# Patient Record
Sex: FEMALE | Race: WHITE | URBAN - METROPOLITAN AREA
[De-identification: names, ages, dates, MRNs, and addresses within clinical notes are randomized per-mention and may not be internally consistent; named-entity substitution may affect disease eponyms.]

---

## 2021-11-02 ENCOUNTER — OFFICE VISIT (OUTPATIENT)
Dept: PULMONOLOGY | Age: 46
End: 2021-11-02
Payer: COMMERCIAL

## 2021-11-02 VITALS
OXYGEN SATURATION: 96 % | HEART RATE: 103 BPM | SYSTOLIC BLOOD PRESSURE: 108 MMHG | WEIGHT: 234 LBS | BODY MASS INDEX: 36.73 KG/M2 | HEIGHT: 67 IN | DIASTOLIC BLOOD PRESSURE: 70 MMHG

## 2021-11-02 DIAGNOSIS — R06.09 DOE (DYSPNEA ON EXERTION): ICD-10-CM

## 2021-11-02 DIAGNOSIS — J45.40 MODERATE PERSISTENT ASTHMA WITHOUT COMPLICATION: Primary | ICD-10-CM

## 2021-11-02 PROCEDURE — G8417 CALC BMI ABV UP PARAM F/U: HCPCS | Performed by: INTERNAL MEDICINE

## 2021-11-02 PROCEDURE — 99205 OFFICE O/P NEW HI 60 MIN: CPT | Performed by: INTERNAL MEDICINE

## 2021-11-02 PROCEDURE — G8427 DOCREV CUR MEDS BY ELIG CLIN: HCPCS | Performed by: INTERNAL MEDICINE

## 2021-11-02 PROCEDURE — G8484 FLU IMMUNIZE NO ADMIN: HCPCS | Performed by: INTERNAL MEDICINE

## 2021-11-02 PROCEDURE — 1036F TOBACCO NON-USER: CPT | Performed by: INTERNAL MEDICINE

## 2021-11-02 RX ORDER — ATORVASTATIN CALCIUM 20 MG/1
20 TABLET, FILM COATED ORAL NIGHTLY
COMMUNITY
Start: 2021-07-06

## 2021-11-02 RX ORDER — BIOTIN 10 MG
1 TABLET ORAL DAILY
COMMUNITY

## 2021-11-02 RX ORDER — ESCITALOPRAM OXALATE 10 MG/1
10 TABLET ORAL
COMMUNITY

## 2021-11-02 RX ORDER — LANOLIN ALCOHOL/MO/W.PET/CERES
3 CREAM (GRAM) TOPICAL NIGHTLY
COMMUNITY

## 2021-11-02 RX ORDER — IPRATROPIUM BROMIDE AND ALBUTEROL SULFATE 2.5; .5 MG/3ML; MG/3ML
1 SOLUTION RESPIRATORY (INHALATION) 4 TIMES DAILY
COMMUNITY
Start: 2021-10-20

## 2021-11-02 RX ORDER — FLUTICASONE PROPIONATE 50 MCG
1-2 SPRAY, SUSPENSION (ML) NASAL 2 TIMES DAILY
COMMUNITY
Start: 2021-08-20

## 2021-11-02 RX ORDER — OMEPRAZOLE 20 MG/1
20 CAPSULE, DELAYED RELEASE ORAL DAILY
COMMUNITY

## 2021-11-02 RX ORDER — MAGNESIUM GLUCONATE 27 MG(500)
500 TABLET ORAL DAILY
COMMUNITY

## 2021-11-02 NOTE — PROGRESS NOTES
oncologist, Dr. Plummer Comes today. [facility] As per Dr. Marcelo Castrejon, patient had a CT in September 2021 that showed findings similar to the CT from October 2021. Based on the CT, she changed patient's chemotherapy to p.o. chemotherapy. Patient has been initiated on second cycle of p.o. chemotherapy. The CT from September 2021 is not available for me to review today. I was able to review the CT chest from May 2021 performed at [facility] that showed right hilar fullness with bilateral pulmonary nodules. She had a repeat CT on 10/19/2021 at [facility] that showed worsening right hilar and infrahilar mass with bilateral pulmonary nodules. Also noted to have intralobular septal thickening and 2 low-attenuation areas in the left lower lobe. Multiple sclerotic lesions involving thoracic spine was also noted. I personally reviewed and interpreted all images. REVIEW OF SYSTEMS:   CONSTITUTIONAL SYMPTOMS: The patient denies fever, fatigue, night sweats, weight loss or weight gain. HEENT: No vision changes. No tinnitus, Denies sinus pain. No hoarseness, or dysphagia. NECK: Patient denies swelling in the neck. CARDIOVASCULAR: Denies chest pain, palpitation, syncope. RESPIRATORY: see above. GASTROINTESTINAL: Denies nausea, abdominal pain or change in bowel function. GENITOURINARY: Denies obstructive symptoms. No history of incontinence. BREASTS: No masses or lumps in the breasts. SKIN: No rashes or itching. MUSCULOSKELETAL: Denies weakness or bone pain. NEUROLOGICAL: No headaches or seizures. PSYCHIATRIC: Denies mood swings or depression. ENDOCRINE: Denies heat or cold intolerance or excessive thirst.  HEMATOLOGIC/LYMPHATIC: Denies easy bruising or lymph node swelling. ALLERGIC/IMMUNOLOGIC: No environmental allergies.     PAST MEDICAL HISTORY:   Past Medical History:   Diagnosis Date    ADD (attention deficit disorder)     Asthma     Breast cancer ([facility] [address]  Hypothyroid     Migraines     VASCULAR ARTERY       PAST SURGICAL HISTORY:   Past Surgical History:   Procedure Laterality Date    BREAST ENHANCEMENT SURGERY      BREAST LUMPECTOMY      COSMETIC SURGERY      LIPOSUCTION      THYROID SURGERY      RIGHT SIDE REMOVED    TUMOR REMOVAL      NON CANEROUS BREAST    TYMPANOSTOMY TUBE PLACEMENT          SOCIAL HISTORY:   Social History     Tobacco Use    Smoking status: Never Smoker    Smokeless tobacco: Never Used   Vaping Use    Vaping Use: Never used   Substance Use Topics    Alcohol use: Yes     Comment: SOCIALLY (NOT SINCE PREGNANT)    Drug use: No       FAMILY HISTORY:   Family History   Problem Relation Age of Onset    Cancer Father        MEDICATIONS:     Current Outpatient Medications on File Prior to Visit   Medication Sig Dispense Refill    mometasone-formoterol (DULERA) 100-5 MCG/ACT inhaler Inhale 2 puffs into the lungs 2 times daily      metFORMIN (GLUCOPHAGE) 500 MG tablet Take 500 mg by mouth 2 times daily (with meals)      Multiple Vitamins-Minerals (THERAPEUTIC-M) TABS Take 1 tablet by mouth daily      omeprazole (PRILOSEC) 20 MG delayed release capsule Take 20 mg by mouth daily      magnesium gluconate (MAGONATE) 500 MG tablet Take 500 mg by mouth daily      melatonin 3 MG TABS tablet Take 3 mg by mouth nightly      ipratropium-albuterol (DUONEB) 0.5-2.5 (3) MG/3ML SOLN nebulizer solution Take 1 vial by nebulization 4 times daily      fluticasone (FLONASE) 50 MCG/ACT nasal spray 1-2 sprays by Nasal route 2 times daily      escitalopram (LEXAPRO) 10 MG tablet Take 10 mg by mouth      atorvastatin (LIPITOR) 20 MG tablet Take 20 mg by mouth nightly      buPROPion (WELLBUTRIN) 75 MG tablet Take 75 mg by mouth 2 times daily      albuterol (PROVENTIL HFA;VENTOLIN HFA) 108 (90 BASE) MCG/ACT inhaler Inhale 2 puffs into the lungs every 6 hours as needed for Wheezing      tamoxifen (NOLVADEX) 10 MG tablet Take 10 mg by mouth daily No current facility-administered medications on file prior to visit. ALLERGIES:   Allergies as of 11/02/2021 - Fully Reviewed 11/02/2021   Allergen Reaction Noted    Latex Hives 07/02/2016      OBJECTIVE:   height is 5' 7\" (1.702 m) and weight is 234 lb (106.1 kg). Her blood pressure is 108/70 and her pulse is 103. Her oxygen saturation is 96%. PHYSICAL EXAM:    CONSTITUTIONAL: She is a 48y.o.-year-old who appears her stated age. She is alert and oriented x 3 and in no acute distress. HEENT: PERRLA, EOMI. No scleral icterus. No thrush, atraumatic, normocephalic. NECK: Supple, without cervical or supraclavicular lymphadenopathy:  CARDIOVASCULAR: S1 S2 RRR. Without murmer  RESPIRATORY & CHEST: Lungs are clear to auscultation and percussion. No wheezing, no crackles. Good air movement  GASTROINTESTINAL & ABDOMEN: Soft, nontender, positive bowel sounds in all quadrants, non-distended, without hepatosplenomegaly. GENITOURINARY: Deferred. MUSCULOSKELETAL: No tenderness to palpation of the axial skeleton. There is no clubbing. No cyanosis. No edema of the lower extremities. SKIN OF BODY: No rash or jaundice. PSYCHIATRIC EVALUATION: Normal affect. Patient answers questions appropriately. HEMATOLOGIC/LYMPHATIC/ IMMUNOLOGIC: No palpable lymphadenopathy. NEUROLOGIC: Alert and oriented x 3. Groslly non-focal. Motor strength is 5+/5 in all muscle groups. The patient has a normal sensorium globally. LABS:  Lab Results   Component Value Date    WBC 11.3 (H) 03/19/2010    HGB 11.7 (L) 03/19/2010    HCT 34.0 (L) 03/19/2010     03/19/2010           ASSESSMENT AND PLAN:     1. Dyspnea on exertion  Patient dyspnea on exertion is likely secondary to abnormal CT findings. CT from October 2021 showed interlobular septal thickening on right side likely suggestive of lymphangitic spread of cancer. It also showed worsening right hilar and infrahilar mass compared to May 2021.   Patient has metastatic breast cancer with metastasis to lungs, liver as well as spine. As per patient's oncologist, Dr. Ericka Tan in [facility], patient did have a CT in September 2021 that shows findings similar to October 2021. Based on that CT from September 2021, her chemotherapy was changed to p.o. chemotherapy. Her oncologist is aware of the worsening CT from May 2021. I will obtain the images of CT from September 2021 and compare it with October 2021. Based on that, further recommendations will follow. 2.  Asthma  Continue Dulera 200/5 2 puffs twice daily. Continue albuterol inhaler as needed    Follow-up in 6 to 8 weeks        Stella Bird MD  Pulmonary Critical Care and Sleep Medicine  Electronically signed by Stella Bird MD on 11/2/2021 at 3:58 PM     This note was completed using dragon medical speech recognition software. Grammatical errors, random word insertions, pronoun errors and incomplete sentences are occasional consequences of this technology due to software limitations. If there are questions or concerns about the content of this note of information contained within the body of this dictation they should be addressed with the provider for clarification.

## 2021-11-03 DIAGNOSIS — J45.40 MODERATE PERSISTENT ASTHMA WITHOUT COMPLICATION: Primary | ICD-10-CM

## 2021-11-03 RX ORDER — PREDNISONE 10 MG/1
TABLET ORAL
Qty: 20 TABLET | Refills: 0 | Status: SHIPPED | OUTPATIENT
Start: 2021-11-03 | End: 2021-12-10

## 2021-12-08 ENCOUNTER — TELEPHONE (OUTPATIENT)
Dept: PULMONOLOGY | Age: 46
End: 2021-12-08

## 2021-12-08 NOTE — TELEPHONE ENCOUNTER
Pt called and said she is still experiencing wheezing, tightness. The prednisone helped but she feels it's coming back and would like to have another round.     Call her to let her know      Ph # [phone]

## 2021-12-09 RX ORDER — PREDNISONE 10 MG/1
TABLET ORAL
Qty: 20 TABLET | Refills: 0 | Status: SHIPPED | OUTPATIENT
Start: 2021-12-09

## 2021-12-09 NOTE — TELEPHONE ENCOUNTER
Attempted to call patient again to see if she could come in for an appt with Dr. Natalie Smith. LM on  for pt to call office.

## 2021-12-09 NOTE — TELEPHONE ENCOUNTER
I have sent the prednisone taper to patient's pharmacy. However, it would be better if she gets evaluated. Not sure what is going on for her metastatic breast cancer. As per my last note, her chemotherapy was changed by her oncologist based on patient's worsening CT chest from September 2021.

## 2021-12-09 NOTE — TELEPHONE ENCOUNTER
I attempted to contact pt x 2 and had to leave a message for her. She has not returned call. Dr. Kathryn Pedersen, would you like to give pt a refill on the prednisone?

## 2021-12-10 ENCOUNTER — OFFICE VISIT (OUTPATIENT)
Dept: PULMONOLOGY | Age: 46
End: 2021-12-10
Payer: MEDICARE

## 2021-12-10 VITALS
SYSTOLIC BLOOD PRESSURE: 128 MMHG | BODY MASS INDEX: 36.1 KG/M2 | WEIGHT: 230 LBS | HEIGHT: 67 IN | OXYGEN SATURATION: 91 % | DIASTOLIC BLOOD PRESSURE: 82 MMHG | HEART RATE: 123 BPM

## 2021-12-10 DIAGNOSIS — R06.09 DOE (DYSPNEA ON EXERTION): Primary | ICD-10-CM

## 2021-12-10 DIAGNOSIS — J45.40 MODERATE PERSISTENT ASTHMA WITHOUT COMPLICATION: ICD-10-CM

## 2021-12-10 PROCEDURE — G8427 DOCREV CUR MEDS BY ELIG CLIN: HCPCS | Performed by: INTERNAL MEDICINE

## 2021-12-10 PROCEDURE — 99214 OFFICE O/P EST MOD 30 MIN: CPT | Performed by: INTERNAL MEDICINE

## 2021-12-10 PROCEDURE — G8484 FLU IMMUNIZE NO ADMIN: HCPCS | Performed by: INTERNAL MEDICINE

## 2021-12-10 PROCEDURE — 1036F TOBACCO NON-USER: CPT | Performed by: INTERNAL MEDICINE

## 2021-12-10 PROCEDURE — G8417 CALC BMI ABV UP PARAM F/U: HCPCS | Performed by: INTERNAL MEDICINE

## 2021-12-10 RX ORDER — PREDNISONE 10 MG/1
TABLET ORAL
Qty: 20 TABLET | Refills: 0 | Status: SHIPPED | OUTPATIENT
Start: 2021-12-10

## 2021-12-10 NOTE — PROGRESS NOTES
PULMONARY OFFICE FOLLOW UP NOTE    REASON FOR VISIT:   Chief Complaint   Patient presents with    Wheezing    Shortness of Breath    Cough     productive of clear phlegm        DATE OF VISIT: 12/10/2021    HISTORY OF PRESENT ILLNESS: 48y.o. year old female is here for follow-up of shortness of breath, asthma and allergic rhinitis. She has past medical history of metastatic breast cancer. Patient has been complaining of worsening shortness of breath over the last few weeks. When I last saw her in early November 2021, I had given her a course of prednisone which improved her symptoms. But slowly she has been noticing worsening shortness of breath. Patient has also been doing some holistic approach for her cancer and is inhaling hydrogen peroxide in the lungs. She has done 4-5 treatments. She recently had rib x-ray performed on 12/8/2021. I do not have the x-ray images to review but have the report which mentioned diffuse interstitial opacification throughout both lungs. Patient stated that she was diagnosed with asthma at a very young age. She has used inhaler all her life. She has been using Dulera inhaler which was pretty much controlling her symptoms. She was not feeling the need to use albuterol inhaler. However, since May 2021 she started noticing worsening shortness of breath associated with cough. No wheezing. No chest pain. She finds it hard to sleep on the right side because of shortness of breath. She is a lifelong non-smoker.     Patient was diagnosed with left breast cancer in August 2015 when she was initiated on tamoxifen and Lupron for 2 years. It was eventually stopped due to weight gain. In 2018 she was noted to have abnormal left x-ray lymph nodes and underwent biopsy which was suggestive of metastatic breast cancer and she was initiated on Adriamycin/Cytoxan followed by Taxol.   In July 2019 she was initiated on radiation therapy to left chest wall and neck with lymph node and started on Femara in September 2019. In May 2021 she reported to the ER with shortness of breath and cough and underwent CT chest and was noted to have multiple bilateral pulmonary nodules suspicious for metastatic disease with fullness in the right hilum and sclerotic bone lesion involving the T6 vertebral body. Bone scan was concerning for metastatic disease. She underwent right lung nodule needle biopsy on 6/3/2021 which was positive for metastatic adenocarcinoma consistent with breast primary. She was initiated on flasodex/abemaciclib/Zometa.       I spoke to patient's oncologist, Dr. Duglas Pardo today. She is in [facility]. As per Dr. Pankaj Pena, patient had a CT in September 2021 that showed findings similar to the CT from October 2021. Based on the CT, she changed patient's chemotherapy to p.o. chemotherapy. Patient has been initiated on second cycle of p.o. chemotherapy. The CT from September 2021 is not available for me to review today.     I was able to review the CT chest from May 2021 performed at [facility] that showed right hilar fullness with bilateral pulmonary nodules. She had a repeat CT on 10/19/2021 at [facility] that showed worsening right hilar and infrahilar mass with bilateral pulmonary nodules. Also noted to have intralobular septal thickening and 2 low-attenuation areas in the left lower lobe. Multiple sclerotic lesions involving thoracic spine was also noted. I personally reviewed and interpreted all images.       REVIEW OF SYSTEMS:   CONSTITUTIONAL SYMPTOMS: The patient denies fever, fatigue, night sweats, weight loss or weight gain. HEENT: No vision changes. No tinnitus, Denies sinus pain. No hoarseness, or dysphagia. NECK: Patient denies swelling in the neck. CARDIOVASCULAR: Denies chest pain, palpitation, syncope. RESPIRATORY: Positive for shortness of breath or cough.    GASTROINTESTINAL: Denies nausea, abdominal pain or change in bowel function. GENITOURINARY: Denies obstructive symptoms. No history of incontinence. BREASTS: No masses or lumps in the breasts. SKIN: No rashes or itching. MUSCULOSKELETAL: Denies weakness or bone pain. NEUROLOGICAL: No headaches or seizures. PSYCHIATRIC: Denies mood swings or depression. ENDOCRINE: Denies heat or cold intolerance or excessive thirst.  HEMATOLOGIC/LYMPHATIC: Denies easy bruising or lymph node swelling. ALLERGIC/IMMUNOLOGIC: No environmental allergies.     PAST MEDICAL HISTORY:   Past Medical History:   Diagnosis Date    ADD (attention deficit disorder)     Asthma     Breast cancer ([facility] [address]     Hypothyroid     Migraines     VASCULAR ARTERY       PAST SURGICAL HISTORY:   Past Surgical History:   Procedure Laterality Date    BREAST ENHANCEMENT SURGERY      BREAST LUMPECTOMY      COSMETIC SURGERY      LIPOSUCTION      THYROID SURGERY      RIGHT SIDE REMOVED    TUMOR REMOVAL      NON CANEROUS BREAST    TYMPANOSTOMY TUBE PLACEMENT         SOCIAL HISTORY:   Social History     Tobacco Use    Smoking status: Never Smoker    Smokeless tobacco: Never Used   Vaping Use    Vaping Use: Never used   Substance Use Topics    Alcohol use: Yes     Comment: SOCIALLY (NOT SINCE PREGNANT)    Drug use: No       FAMILY HISTORY:   Family History   Problem Relation Age of Onset    Cancer Father        MEDICATIONS:     Current Outpatient Medications on File Prior to Visit   Medication Sig Dispense Refill    mometasone-formoterol (DULERA) 200-5 MCG/ACT inhaler Inhale 2 puffs into the lungs every 12 hours 3 each 3    metFORMIN (GLUCOPHAGE) 500 MG tablet Take 500 mg by mouth 2 times daily (with meals)      Multiple Vitamins-Minerals (THERAPEUTIC-M) TABS Take 1 tablet by mouth daily      omeprazole (PRILOSEC) 20 MG delayed release capsule Take 20 mg by mouth daily      magnesium gluconate (MAGONATE) 500 MG tablet Take 500 mg by mouth daily      melatonin 3 MG TABS tablet Take 3 mg by mouth nightly  ipratropium-albuterol (DUONEB) 0.5-2.5 (3) MG/3ML SOLN nebulizer solution Take 1 vial by nebulization 4 times daily      fluticasone (FLONASE) 50 MCG/ACT nasal spray 1-2 sprays by Nasal route 2 times daily      escitalopram (LEXAPRO) 10 MG tablet Take 10 mg by mouth      atorvastatin (LIPITOR) 20 MG tablet Take 20 mg by mouth nightly      tamoxifen (NOLVADEX) 10 MG tablet Take 10 mg by mouth daily      buPROPion (WELLBUTRIN) 75 MG tablet Take 75 mg by mouth 2 times daily      albuterol (PROVENTIL HFA;VENTOLIN HFA) 108 (90 BASE) MCG/ACT inhaler Inhale 2 puffs into the lungs every 6 hours as needed for Wheezing      predniSONE (DELTASONE) 10 MG tablet 40 mg for 2 days,30 mg for 2 days,20 mg for 2 days,10 mg for 2 days (Patient not taking: Reported on 12/10/2021) 20 tablet 0     No current facility-administered medications on file prior to visit. ALLERGIES:   Allergies as of 12/10/2021 - Fully Reviewed 12/10/2021   Allergen Reaction Noted    Latex Hives 07/02/2016      OBJECTIVE:   height is 5' 7\" (1.702 m) and weight is 230 lb (104.3 kg). Her blood pressure is 128/82 and her pulse is 123. Her oxygen saturation is 91%. PHYSICAL EXAM:    CONSTITUTIONAL: She is a 48y.o.-year-old who appears her stated age. She is alert and oriented x 3 and in no acute distress. HEENT: PERRL. No scleral icterus. No thrush, atraumatic, normocephalic. NECK: Supple, without cervical or supraclavicular lymphadenopathy:  CARDIOVASCULAR: S1 S2 RRR. Without murmer  RESPIRATORY & CHEST: Lungs are clear to auscultation and percussion. No wheezing, no crackles. Good air movement  GASTROINTESTINAL & ABDOMEN: Soft, nontender, positive bowel sounds in all quadrants, non-distended, without hepatosplenomegaly. GENITOURINARY: Deferred. MUSCULOSKELETAL: No tenderness to palpation of the axial skeleton. There is no clubbing. No cyanosis. No edema of the lower extremities. SKIN OF BODY: No rash or jaundice. PSYCHIATRIC EVALUATION: Normal affect. Patient answers questions appropriately. HEMATOLOGIC/LYMPHATIC/ IMMUNOLOGIC: No palpable lymphadenopathy. NEUROLOGIC: Alert and oriented x 3. Groslly non-focal. Motor strength is 5+/5 in all muscle groups. The patient has a normal sensorium globally. ASSESSMENT AND PLAN:     1. LANZA (dyspnea on exertion)  Recent rib x-ray report mention diffuse interstitial opacifications throughout both lungs. With history of hydrogen peroxide inhalation in the lung, not sure if you are dealing with pneumonitis. I will give her a course of prednisone and see response to treatment. I have also advised the patient to stop hydrogen peroxide inhalation in the lungs. Patient is also due for repeat CT chest on 12/29/2021 after fourth round of her current chemotherapy. I have advised the patient to bring the CT for me to review. CT from October 2021 showed interlobular septal thickening on right side likely suggestive of lymphangitic spread of cancer. It also showed worsening right hilar and infrahilar mass compared to May 2021. Patient has metastatic breast cancer with metastasis to lungs, liver as well as spine. As per patient's oncologist, Dr. Austine Lesch in [facility], patient did have a CT in September 2021 that shows findings similar to October 2021. Based on that CT from September 2021, her chemotherapy was changed to p.o. chemotherapy. Her oncologist is aware of the worsening CT from May 2021.     - predniSONE (DELTASONE) 10 MG tablet; 40 mg for 2 days,30 mg for 2 days,20 mg for 2 days,10 mg for 2 days  Dispense: 20 tablet; Refill: 0      2. Asthma  Continue Dulera 200/5 2 puffs twice daily. Continue albuterol inhaler as needed        Huy Live MD  Pulmonary Critical Care and Sleep Medicine  Electronically signed by Huy Live MD on 12/10/2021 at 11:41 AM     This note was completed using dragon medical speech recognition software.  Grammatical errors, random word insertions, pronoun errors and incomplete sentences are occasional consequences of this technology due to software limitations. If there are questions or concerns about the content of this note of information contained within the body of this dictation they should be addressed with the provider for clarification.